# Patient Record
Sex: FEMALE | Race: WHITE | NOT HISPANIC OR LATINO | URBAN - METROPOLITAN AREA
[De-identification: names, ages, dates, MRNs, and addresses within clinical notes are randomized per-mention and may not be internally consistent; named-entity substitution may affect disease eponyms.]

---

## 2018-03-23 ENCOUNTER — OFFICE (OUTPATIENT)
Dept: URBAN - METROPOLITAN AREA CLINIC 11 | Facility: CLINIC | Age: 48
End: 2018-03-23

## 2018-03-23 DIAGNOSIS — R19.7 DIARRHEA, UNSPECIFIED: ICD-10-CM

## 2018-03-28 ENCOUNTER — OFFICE (OUTPATIENT)
Dept: URBAN - METROPOLITAN AREA CLINIC 11 | Facility: CLINIC | Age: 48
End: 2018-03-28

## 2018-03-28 VITALS
HEART RATE: 90 BPM | SYSTOLIC BLOOD PRESSURE: 156 MMHG | WEIGHT: 196 LBS | DIASTOLIC BLOOD PRESSURE: 97 MMHG | HEIGHT: 69 IN

## 2018-03-28 DIAGNOSIS — R10.84 GENERALIZED ABDOMINAL PAIN: ICD-10-CM

## 2018-03-28 DIAGNOSIS — R19.7 DIARRHEA, UNSPECIFIED: ICD-10-CM

## 2018-03-28 DIAGNOSIS — D47.09 OTHER MAST CELL NEOPLASMS OF UNCERTAIN BEHAVIOR: ICD-10-CM

## 2018-03-28 LAB
BASIC METABOLIC PANEL (8): BUN/CREATININE RATIO: 6 — LOW (ref 9–23)
BASIC METABOLIC PANEL (8): BUN: 5 MG/DL — LOW (ref 6–24)
BASIC METABOLIC PANEL (8): CALCIUM: 10 MG/DL (ref 8.7–10.2)
BASIC METABOLIC PANEL (8): CARBON DIOXIDE, TOTAL: 24 MMOL/L (ref 18–29)
BASIC METABOLIC PANEL (8): CHLORIDE: 100 MMOL/L (ref 96–106)
BASIC METABOLIC PANEL (8): CREATININE: 0.85 MG/DL (ref 0.57–1)
BASIC METABOLIC PANEL (8): EGFR IF AFRICN AM: 95 ML/MIN/1.73 (ref 59–?)
BASIC METABOLIC PANEL (8): EGFR IF NONAFRICN AM: 82 ML/MIN/1.73 (ref 59–?)
BASIC METABOLIC PANEL (8): GLUCOSE: 95 MG/DL (ref 65–99)
BASIC METABOLIC PANEL (8): POTASSIUM: 3.9 MMOL/L (ref 3.5–5.2)
BASIC METABOLIC PANEL (8): SODIUM: 143 MMOL/L (ref 134–144)
MAGNESIUM: 2.1 MG/DL (ref 1.6–2.3)

## 2018-03-28 PROCEDURE — 99213 OFFICE O/P EST LOW 20 MIN: CPT | Performed by: INTERNAL MEDICINE

## 2018-03-28 PROCEDURE — 36415 COLL VENOUS BLD VENIPUNCTURE: CPT | Performed by: INTERNAL MEDICINE

## 2018-04-10 ENCOUNTER — OFFICE (OUTPATIENT)
Dept: URBAN - METROPOLITAN AREA CLINIC 19 | Facility: CLINIC | Age: 48
End: 2018-04-10

## 2018-04-27 ENCOUNTER — OFFICE (OUTPATIENT)
Dept: URBAN - METROPOLITAN AREA CLINIC 19 | Facility: CLINIC | Age: 48
End: 2018-04-27

## 2018-05-01 ENCOUNTER — OFFICE (OUTPATIENT)
Dept: URBAN - METROPOLITAN AREA CLINIC 22 | Facility: CLINIC | Age: 48
End: 2018-05-01

## 2018-05-01 DIAGNOSIS — K62.5 HEMORRHAGE OF ANUS AND RECTUM: ICD-10-CM

## 2018-05-01 DIAGNOSIS — R19.7 DIARRHEA, UNSPECIFIED: ICD-10-CM

## 2018-05-01 DIAGNOSIS — R19.5 OTHER FECAL ABNORMALITIES: ICD-10-CM

## 2018-05-01 DIAGNOSIS — R10.84 GENERALIZED ABDOMINAL PAIN: ICD-10-CM

## 2018-05-01 PROCEDURE — 74176 CT ABD & PELVIS W/O CONTRAST: CPT | Performed by: INTERNAL MEDICINE

## 2018-05-11 ENCOUNTER — AMBULATORY SURGICAL CENTER (OUTPATIENT)
Dept: URBAN - METROPOLITAN AREA SURGERY 3 | Facility: SURGERY | Age: 48
End: 2018-05-11

## 2018-05-11 ENCOUNTER — OFFICE (OUTPATIENT)
Dept: URBAN - METROPOLITAN AREA PATHOLOGY 22 | Facility: PATHOLOGY | Age: 48
End: 2018-05-11

## 2018-05-11 VITALS
OXYGEN SATURATION: 96 % | OXYGEN SATURATION: 98 % | TEMPERATURE: 98 F | RESPIRATION RATE: 14 BRPM | DIASTOLIC BLOOD PRESSURE: 84 MMHG | DIASTOLIC BLOOD PRESSURE: 75 MMHG | OXYGEN SATURATION: 100 % | HEART RATE: 76 BPM | OXYGEN SATURATION: 97 % | TEMPERATURE: 98 F | HEART RATE: 74 BPM | HEART RATE: 71 BPM | HEART RATE: 74 BPM | RESPIRATION RATE: 18 BRPM | SYSTOLIC BLOOD PRESSURE: 138 MMHG | SYSTOLIC BLOOD PRESSURE: 136 MMHG | DIASTOLIC BLOOD PRESSURE: 85 MMHG | OXYGEN SATURATION: 98 % | SYSTOLIC BLOOD PRESSURE: 141 MMHG | RESPIRATION RATE: 16 BRPM | SYSTOLIC BLOOD PRESSURE: 129 MMHG | RESPIRATION RATE: 18 BRPM | OXYGEN SATURATION: 97 % | RESPIRATION RATE: 17 BRPM | SYSTOLIC BLOOD PRESSURE: 142 MMHG | RESPIRATION RATE: 16 BRPM | DIASTOLIC BLOOD PRESSURE: 92 MMHG | HEIGHT: 69 IN | RESPIRATION RATE: 22 BRPM | RESPIRATION RATE: 19 BRPM | RESPIRATION RATE: 22 BRPM | SYSTOLIC BLOOD PRESSURE: 144 MMHG | HEART RATE: 80 BPM | RESPIRATION RATE: 19 BRPM | WEIGHT: 196 LBS | SYSTOLIC BLOOD PRESSURE: 121 MMHG | DIASTOLIC BLOOD PRESSURE: 84 MMHG | SYSTOLIC BLOOD PRESSURE: 138 MMHG | DIASTOLIC BLOOD PRESSURE: 97 MMHG | DIASTOLIC BLOOD PRESSURE: 68 MMHG | HEART RATE: 86 BPM | SYSTOLIC BLOOD PRESSURE: 129 MMHG | SYSTOLIC BLOOD PRESSURE: 144 MMHG | OXYGEN SATURATION: 96 % | HEART RATE: 71 BPM | HEART RATE: 80 BPM | SYSTOLIC BLOOD PRESSURE: 121 MMHG | OXYGEN SATURATION: 100 % | WEIGHT: 196 LBS | DIASTOLIC BLOOD PRESSURE: 97 MMHG | HEIGHT: 69 IN | HEART RATE: 76 BPM | SYSTOLIC BLOOD PRESSURE: 141 MMHG | DIASTOLIC BLOOD PRESSURE: 92 MMHG | RESPIRATION RATE: 14 BRPM | SYSTOLIC BLOOD PRESSURE: 136 MMHG | HEART RATE: 75 BPM | SYSTOLIC BLOOD PRESSURE: 142 MMHG | TEMPERATURE: 97.7 F | RESPIRATION RATE: 17 BRPM | DIASTOLIC BLOOD PRESSURE: 68 MMHG | DIASTOLIC BLOOD PRESSURE: 75 MMHG | TEMPERATURE: 97.7 F | HEART RATE: 86 BPM | HEART RATE: 75 BPM | DIASTOLIC BLOOD PRESSURE: 85 MMHG

## 2018-05-11 DIAGNOSIS — R19.5 OTHER FECAL ABNORMALITIES: ICD-10-CM

## 2018-05-11 DIAGNOSIS — K52.9 NONINFECTIVE GASTROENTERITIS AND COLITIS, UNSPECIFIED: ICD-10-CM

## 2018-05-11 DIAGNOSIS — R19.7 DIARRHEA, UNSPECIFIED: ICD-10-CM

## 2018-05-11 PROBLEM — K92.2 GASTROINTESTINAL HEMORRHAGE, UNSPECIFIED: Status: ACTIVE | Noted: 2018-05-11

## 2018-05-11 PROBLEM — K52.89 CLINICALLY SIGNIFICANT DIARRHEA OF UNEXPLAINED ORIGIN: Status: ACTIVE | Noted: 2018-05-11

## 2018-05-11 PROBLEM — K92.2 EVALUATION OF UNEXPLAINED GI BLEEDING: Status: ACTIVE | Noted: 2018-05-11

## 2018-05-11 PROCEDURE — 45380 COLONOSCOPY AND BIOPSY: CPT | Performed by: INTERNAL MEDICINE

## 2018-05-11 PROCEDURE — 88341 IMHCHEM/IMCYTCHM EA ADD ANTB: CPT | Performed by: INTERNAL MEDICINE

## 2018-05-11 PROCEDURE — 88305 TISSUE EXAM BY PATHOLOGIST: CPT | Performed by: INTERNAL MEDICINE

## 2018-05-11 PROCEDURE — 88342 IMHCHEM/IMCYTCHM 1ST ANTB: CPT | Performed by: INTERNAL MEDICINE

## 2019-03-11 ENCOUNTER — OFFICE (OUTPATIENT)
Dept: URBAN - METROPOLITAN AREA CLINIC 19 | Facility: CLINIC | Age: 49
End: 2019-03-11

## 2019-03-12 ENCOUNTER — OFFICE (OUTPATIENT)
Dept: URBAN - METROPOLITAN AREA CLINIC 22 | Facility: CLINIC | Age: 49
End: 2019-03-12

## 2019-03-12 DIAGNOSIS — R19.7 DIARRHEA, UNSPECIFIED: ICD-10-CM

## 2019-03-12 DIAGNOSIS — R19.5 OTHER FECAL ABNORMALITIES: ICD-10-CM

## 2019-03-12 DIAGNOSIS — R10.84 GENERALIZED ABDOMINAL PAIN: ICD-10-CM

## 2019-03-12 DIAGNOSIS — K62.5 HEMORRHAGE OF ANUS AND RECTUM: ICD-10-CM

## 2019-03-12 PROCEDURE — 74177 CT ABD & PELVIS W/CONTRAST: CPT | Performed by: INTERNAL MEDICINE

## 2019-03-21 ENCOUNTER — OFFICE (OUTPATIENT)
Dept: URBAN - METROPOLITAN AREA CLINIC 11 | Facility: CLINIC | Age: 49
End: 2019-03-21

## 2019-03-21 VITALS
HEIGHT: 69 IN | SYSTOLIC BLOOD PRESSURE: 130 MMHG | DIASTOLIC BLOOD PRESSURE: 104 MMHG | HEART RATE: 95 BPM | WEIGHT: 209 LBS

## 2019-03-21 DIAGNOSIS — R19.5 OTHER FECAL ABNORMALITIES: ICD-10-CM

## 2019-03-21 DIAGNOSIS — R19.7 DIARRHEA, UNSPECIFIED: ICD-10-CM

## 2019-03-21 DIAGNOSIS — K52.9 NONINFECTIVE GASTROENTERITIS AND COLITIS, UNSPECIFIED: ICD-10-CM

## 2019-03-21 DIAGNOSIS — D47.09 OTHER MAST CELL NEOPLASMS OF UNCERTAIN BEHAVIOR: ICD-10-CM

## 2019-03-21 DIAGNOSIS — K62.5 HEMORRHAGE OF ANUS AND RECTUM: ICD-10-CM

## 2019-03-21 PROCEDURE — 99213 OFFICE O/P EST LOW 20 MIN: CPT | Performed by: INTERNAL MEDICINE

## 2019-03-21 NOTE — SERVICEHPINOTES
48-year-old female with a history of systemic mastocytosis as well as autonomic dysfunction and gastrointestinal dysmotility.  Patient has had intermittent flare of symptoms with cramping, diarrhea, mucus and blood in the stool over the last 12-14 months.  These have been self-limited but colonoscopy with biopsy reveal left-sided colitis suggesting a protracted course.  Histology favored protracted course for acute self-limited colitis but inflammatory bowel disease could not be totally excluded.Patient reports that 13 days ago she began having another flare with a change in the character for chronic diarrhea with increased blood in mucus in the stool.  She was treated with metronidazole for 1 week but did not notice any change in symptoms.  She has been on Librax Colestid and probiotic with only questionable improvement after restarting Colestid.  She continues to complain of urgency with small volume mucoid bloody stool when she uses the restroom.

## 2019-03-21 NOTE — SERVICENOTES
Will perform flexible sigmoidoscopy to collect stools and biopsy.  This will depend determine further treatment though there is a possibility she may have be on long-term medication such as mesalamine.

## 2019-03-28 ENCOUNTER — OFFICE (OUTPATIENT)
Dept: URBAN - METROPOLITAN AREA PATHOLOGY 22 | Facility: PATHOLOGY | Age: 49
End: 2019-03-28

## 2019-03-28 ENCOUNTER — AMBULATORY SURGICAL CENTER (OUTPATIENT)
Dept: URBAN - METROPOLITAN AREA SURGERY 3 | Facility: SURGERY | Age: 49
End: 2019-03-28

## 2019-03-28 VITALS
WEIGHT: 205 LBS | RESPIRATION RATE: 18 BRPM | HEART RATE: 82 BPM | OXYGEN SATURATION: 96 % | RESPIRATION RATE: 18 BRPM | SYSTOLIC BLOOD PRESSURE: 153 MMHG | DIASTOLIC BLOOD PRESSURE: 98 MMHG | DIASTOLIC BLOOD PRESSURE: 94 MMHG | DIASTOLIC BLOOD PRESSURE: 95 MMHG | DIASTOLIC BLOOD PRESSURE: 98 MMHG | HEART RATE: 85 BPM | HEART RATE: 84 BPM | SYSTOLIC BLOOD PRESSURE: 140 MMHG | SYSTOLIC BLOOD PRESSURE: 148 MMHG | WEIGHT: 205 LBS | DIASTOLIC BLOOD PRESSURE: 81 MMHG | HEIGHT: 69 IN | TEMPERATURE: 98 F | HEART RATE: 98 BPM | HEART RATE: 82 BPM | OXYGEN SATURATION: 98 % | SYSTOLIC BLOOD PRESSURE: 151 MMHG | OXYGEN SATURATION: 98 % | DIASTOLIC BLOOD PRESSURE: 81 MMHG | DIASTOLIC BLOOD PRESSURE: 95 MMHG | HEART RATE: 84 BPM | DIASTOLIC BLOOD PRESSURE: 94 MMHG | RESPIRATION RATE: 17 BRPM | SYSTOLIC BLOOD PRESSURE: 157 MMHG | SYSTOLIC BLOOD PRESSURE: 157 MMHG | TEMPERATURE: 98 F | OXYGEN SATURATION: 97 % | SYSTOLIC BLOOD PRESSURE: 140 MMHG | OXYGEN SATURATION: 97 % | HEIGHT: 69 IN | RESPIRATION RATE: 20 BRPM | SYSTOLIC BLOOD PRESSURE: 153 MMHG | RESPIRATION RATE: 20 BRPM | OXYGEN SATURATION: 96 % | RESPIRATION RATE: 17 BRPM | SYSTOLIC BLOOD PRESSURE: 151 MMHG | HEART RATE: 98 BPM | HEART RATE: 85 BPM | SYSTOLIC BLOOD PRESSURE: 148 MMHG

## 2019-03-28 DIAGNOSIS — K51.90 ULCERATIVE COLITIS, UNSPECIFIED, WITHOUT COMPLICATIONS: ICD-10-CM

## 2019-03-28 DIAGNOSIS — R19.5 OTHER FECAL ABNORMALITIES: ICD-10-CM

## 2019-03-28 DIAGNOSIS — K52.9 NONINFECTIVE GASTROENTERITIS AND COLITIS, UNSPECIFIED: ICD-10-CM

## 2019-03-28 DIAGNOSIS — R19.7 DIARRHEA, UNSPECIFIED: ICD-10-CM

## 2019-03-28 PROCEDURE — 88341 IMHCHEM/IMCYTCHM EA ADD ANTB: CPT | Performed by: INTERNAL MEDICINE

## 2019-03-28 PROCEDURE — 45331 SIGMOIDOSCOPY AND BIOPSY: CPT | Performed by: INTERNAL MEDICINE

## 2019-03-28 PROCEDURE — 88342 IMHCHEM/IMCYTCHM 1ST ANTB: CPT | Performed by: INTERNAL MEDICINE

## 2019-03-28 PROCEDURE — 88305 TISSUE EXAM BY PATHOLOGIST: CPT | Performed by: INTERNAL MEDICINE

## 2019-04-05 ENCOUNTER — OFFICE (OUTPATIENT)
Dept: URBAN - METROPOLITAN AREA CLINIC 11 | Facility: CLINIC | Age: 49
End: 2019-04-05

## 2019-04-05 VITALS
HEIGHT: 69 IN | SYSTOLIC BLOOD PRESSURE: 165 MMHG | DIASTOLIC BLOOD PRESSURE: 103 MMHG | HEART RATE: 82 BPM | WEIGHT: 211 LBS

## 2019-04-05 DIAGNOSIS — R19.7 DIARRHEA, UNSPECIFIED: ICD-10-CM

## 2019-04-05 DIAGNOSIS — D47.09 OTHER MAST CELL NEOPLASMS OF UNCERTAIN BEHAVIOR: ICD-10-CM

## 2019-04-05 DIAGNOSIS — R11.0 NAUSEA: ICD-10-CM

## 2019-04-05 DIAGNOSIS — K51.90 ULCERATIVE COLITIS, UNSPECIFIED, WITHOUT COMPLICATIONS: ICD-10-CM

## 2019-04-05 DIAGNOSIS — K62.5 HEMORRHAGE OF ANUS AND RECTUM: ICD-10-CM

## 2019-04-05 PROCEDURE — 99215 OFFICE O/P EST HI 40 MIN: CPT | Performed by: INTERNAL MEDICINE

## 2019-04-08 ENCOUNTER — OFFICE (OUTPATIENT)
Dept: URBAN - METROPOLITAN AREA CLINIC 19 | Facility: CLINIC | Age: 49
End: 2019-04-08

## 2019-04-08 LAB
IBD EXPANDED PANEL: ACCA: 28 UNITS (ref 0–90)
IBD EXPANDED PANEL: ALCA: 19 UNITS (ref 0–60)
IBD EXPANDED PANEL: AMCA: 32 UNITS (ref 0–100)
IBD EXPANDED PANEL: ATYPICAL PANCA: NEGATIVE
IBD EXPANDED PANEL: COMMENTS: (no result)
IBD EXPANDED PANEL: GASCA: 32 UNITS (ref 0–50)

## 2019-05-03 ENCOUNTER — OFFICE (OUTPATIENT)
Dept: URBAN - METROPOLITAN AREA CLINIC 11 | Facility: CLINIC | Age: 49
End: 2019-05-03

## 2019-05-03 VITALS
WEIGHT: 207 LBS | SYSTOLIC BLOOD PRESSURE: 158 MMHG | HEART RATE: 66 BPM | DIASTOLIC BLOOD PRESSURE: 99 MMHG | HEIGHT: 69 IN

## 2019-05-03 DIAGNOSIS — D47.09 OTHER MAST CELL NEOPLASMS OF UNCERTAIN BEHAVIOR: ICD-10-CM

## 2019-05-03 DIAGNOSIS — K51.90 ULCERATIVE COLITIS, UNSPECIFIED, WITHOUT COMPLICATIONS: ICD-10-CM

## 2019-05-03 DIAGNOSIS — R19.7 DIARRHEA, UNSPECIFIED: ICD-10-CM

## 2019-05-03 LAB
CALPROTECTIN, FECAL: 22 UG/G (ref 0–120)
GI PROFILE, STOOL, PCR: CAMPYLOBACTER: (no result)
LACTOFERRIN, FECAL, QUANT.: 2.35 UG/ML(G) (ref 0–7.24)
REQUEST PROBLEM: (no result)

## 2019-05-03 PROCEDURE — 99213 OFFICE O/P EST LOW 20 MIN: CPT | Performed by: INTERNAL MEDICINE

## 2019-06-25 ENCOUNTER — OFFICE (OUTPATIENT)
Dept: URBAN - METROPOLITAN AREA CLINIC 19 | Facility: CLINIC | Age: 49
End: 2019-06-25

## 2019-07-31 ENCOUNTER — OFFICE (OUTPATIENT)
Dept: URBAN - METROPOLITAN AREA CLINIC 19 | Facility: CLINIC | Age: 49
End: 2019-07-31

## 2019-08-20 ENCOUNTER — OFFICE (OUTPATIENT)
Dept: URBAN - METROPOLITAN AREA CLINIC 19 | Facility: CLINIC | Age: 49
End: 2019-08-20

## 2019-08-29 ENCOUNTER — OFFICE (OUTPATIENT)
Dept: URBAN - METROPOLITAN AREA CLINIC 11 | Facility: CLINIC | Age: 49
End: 2019-08-29

## 2019-08-29 DIAGNOSIS — K51.90 ULCERATIVE COLITIS, UNSPECIFIED, WITHOUT COMPLICATIONS: ICD-10-CM

## 2019-08-29 PROCEDURE — 96413 CHEMO IV INFUSION 1 HR: CPT | Performed by: INTERNAL MEDICINE

## 2019-08-29 PROCEDURE — 96415 CHEMO IV INFUSION ADDL HR: CPT | Performed by: INTERNAL MEDICINE

## 2019-09-13 ENCOUNTER — OFFICE (OUTPATIENT)
Dept: URBAN - METROPOLITAN AREA CLINIC 11 | Facility: CLINIC | Age: 49
End: 2019-09-13

## 2019-09-13 DIAGNOSIS — K51.90 ULCERATIVE COLITIS, UNSPECIFIED, WITHOUT COMPLICATIONS: ICD-10-CM

## 2019-09-13 PROCEDURE — 96413 CHEMO IV INFUSION 1 HR: CPT | Performed by: INTERNAL MEDICINE

## 2019-09-13 PROCEDURE — 96415 CHEMO IV INFUSION ADDL HR: CPT | Performed by: INTERNAL MEDICINE

## 2019-10-15 ENCOUNTER — OFFICE (OUTPATIENT)
Dept: URBAN - METROPOLITAN AREA CLINIC 11 | Facility: CLINIC | Age: 49
End: 2019-10-15

## 2019-10-15 DIAGNOSIS — K51.90 ULCERATIVE COLITIS, UNSPECIFIED, WITHOUT COMPLICATIONS: ICD-10-CM

## 2019-10-15 PROCEDURE — 96415 CHEMO IV INFUSION ADDL HR: CPT | Performed by: INTERNAL MEDICINE

## 2019-10-15 PROCEDURE — 96413 CHEMO IV INFUSION 1 HR: CPT | Performed by: INTERNAL MEDICINE

## 2019-12-10 ENCOUNTER — OFFICE (OUTPATIENT)
Dept: URBAN - METROPOLITAN AREA CLINIC 11 | Facility: CLINIC | Age: 49
End: 2019-12-10

## 2019-12-10 DIAGNOSIS — K51.90 ULCERATIVE COLITIS, UNSPECIFIED, WITHOUT COMPLICATIONS: ICD-10-CM

## 2019-12-10 PROCEDURE — 96415 CHEMO IV INFUSION ADDL HR: CPT | Performed by: INTERNAL MEDICINE

## 2019-12-10 PROCEDURE — 96413 CHEMO IV INFUSION 1 HR: CPT | Performed by: INTERNAL MEDICINE

## 2020-02-04 ENCOUNTER — OFFICE (OUTPATIENT)
Dept: URBAN - METROPOLITAN AREA CLINIC 11 | Facility: CLINIC | Age: 50
End: 2020-02-04

## 2020-02-04 DIAGNOSIS — K51.90 ULCERATIVE COLITIS, UNSPECIFIED, WITHOUT COMPLICATIONS: ICD-10-CM

## 2020-02-04 PROCEDURE — 96413 CHEMO IV INFUSION 1 HR: CPT | Performed by: INTERNAL MEDICINE

## 2020-04-02 ENCOUNTER — OFFICE (OUTPATIENT)
Dept: URBAN - METROPOLITAN AREA CLINIC 11 | Facility: CLINIC | Age: 50
End: 2020-04-02

## 2020-04-02 DIAGNOSIS — K51.90 ULCERATIVE COLITIS, UNSPECIFIED, WITHOUT COMPLICATIONS: ICD-10-CM

## 2020-04-02 PROCEDURE — 96413 CHEMO IV INFUSION 1 HR: CPT | Performed by: INTERNAL MEDICINE

## 2020-05-13 ENCOUNTER — OFFICE (OUTPATIENT)
Dept: URBAN - METROPOLITAN AREA TELEHEALTH 10 | Facility: TELEHEALTH | Age: 50
End: 2020-05-13

## 2020-05-13 VITALS — HEIGHT: 69 IN

## 2020-05-13 DIAGNOSIS — I10 ESSENTIAL (PRIMARY) HYPERTENSION: ICD-10-CM

## 2020-05-13 DIAGNOSIS — D47.09 OTHER MAST CELL NEOPLASMS OF UNCERTAIN BEHAVIOR: ICD-10-CM

## 2020-05-13 DIAGNOSIS — M35.9 SYSTEMIC INVOLVEMENT OF CONNECTIVE TISSUE, UNSPECIFIED: ICD-10-CM

## 2020-05-13 DIAGNOSIS — Z85.72 PERSONAL HISTORY OF NON-HODGKIN LYMPHOMAS: ICD-10-CM

## 2020-05-13 DIAGNOSIS — K51.90 ULCERATIVE COLITIS, UNSPECIFIED, WITHOUT COMPLICATIONS: ICD-10-CM

## 2020-05-13 PROCEDURE — 99215 OFFICE O/P EST HI 40 MIN: CPT | Performed by: INTERNAL MEDICINE

## 2020-05-13 RX ORDER — METHYLPREDNISOLONE 4 MG/1
TABLET ORAL
Qty: 1 | Refills: 0 | Status: COMPLETED
Start: 2020-05-13 | End: 2020-06-01

## 2020-05-13 NOTE — SERVICEHPINOTES
49-year-old female with a history of systemic mastocytosis, ulcerative colitis as well as autonomic dysfunction and gastrointestinal dysmotility.  She is currently receiving Remicade infusions with trough levels in the therapeutic range and no evidence of antibody formation.In terms of her ulcerative colitis, patient reports mild cramping on an intermittent basis.  Her stools are soft to loose and occurred a frequency of 1-2 per day.  There has been no sign of GI bleeding.  She denies any fever or chills though she does have fairly significant nausea at times.Her till health visit today is related to the fact that she has developed significant joint pain that has been attributed to her ulcerative colitis by her primary care provider as well as her hematologist/oncologist physician.  Recent laboratory by her primary care provider failed to reveal any inflammatory markers.  Her HILARIO, CK, thyroid functions, CRP as well as basic CBC and CMP were normal.Today she appears to be in fairly good spirits and looks well.  She complains of joint pain on a daily basis in her elbows and wrists with a intermittent problems with shoulders, ankles and distal metatarsal area.  She also reports significant muscle spasms as well as severe fatigue. She reports she can sleep for 16 hours a day and still feels tired.

## 2020-05-18 ENCOUNTER — OFFICE (OUTPATIENT)
Dept: URBAN - METROPOLITAN AREA CLINIC 19 | Facility: CLINIC | Age: 50
End: 2020-05-18

## 2020-05-28 ENCOUNTER — OFFICE (OUTPATIENT)
Dept: URBAN - METROPOLITAN AREA CLINIC 11 | Facility: CLINIC | Age: 50
End: 2020-05-28

## 2020-05-28 DIAGNOSIS — K51.90 ULCERATIVE COLITIS, UNSPECIFIED, WITHOUT COMPLICATIONS: ICD-10-CM

## 2020-05-28 PROCEDURE — 96413 CHEMO IV INFUSION 1 HR: CPT | Performed by: INTERNAL MEDICINE

## 2020-07-31 ENCOUNTER — OFFICE (OUTPATIENT)
Dept: URBAN - METROPOLITAN AREA CLINIC 11 | Facility: CLINIC | Age: 50
End: 2020-07-31

## 2020-07-31 DIAGNOSIS — K51.90 ULCERATIVE COLITIS, UNSPECIFIED, WITHOUT COMPLICATIONS: ICD-10-CM

## 2020-07-31 PROCEDURE — 96413 CHEMO IV INFUSION 1 HR: CPT | Performed by: INTERNAL MEDICINE

## 2020-10-06 ENCOUNTER — OFFICE (OUTPATIENT)
Dept: URBAN - METROPOLITAN AREA CLINIC 11 | Facility: CLINIC | Age: 50
End: 2020-10-06

## 2020-10-06 DIAGNOSIS — K51.90 ULCERATIVE COLITIS, UNSPECIFIED, WITHOUT COMPLICATIONS: ICD-10-CM

## 2020-10-06 PROCEDURE — 96413 CHEMO IV INFUSION 1 HR: CPT | Performed by: INTERNAL MEDICINE

## 2020-12-07 ENCOUNTER — OFFICE (OUTPATIENT)
Dept: URBAN - METROPOLITAN AREA CLINIC 11 | Facility: CLINIC | Age: 50
End: 2020-12-07

## 2020-12-07 DIAGNOSIS — K51.90 ULCERATIVE COLITIS, UNSPECIFIED, WITHOUT COMPLICATIONS: ICD-10-CM

## 2020-12-07 PROCEDURE — 96413 CHEMO IV INFUSION 1 HR: CPT | Performed by: INTERNAL MEDICINE

## 2021-01-25 VITALS — HEIGHT: 69 IN

## 2021-01-27 ENCOUNTER — TELEHEALTH PROVIDED OTHER THAN IN PATIENT'S HOME (OUTPATIENT)
Dept: URBAN - METROPOLITAN AREA TELEHEALTH 10 | Facility: TELEHEALTH | Age: 51
End: 2021-01-27

## 2021-01-27 DIAGNOSIS — E60 DIETARY ZINC DEFICIENCY: ICD-10-CM

## 2021-01-27 DIAGNOSIS — E54 ASCORBIC ACID DEFICIENCY: ICD-10-CM

## 2021-01-27 DIAGNOSIS — R11.0 NAUSEA: ICD-10-CM

## 2021-01-27 DIAGNOSIS — M35.9 SYSTEMIC INVOLVEMENT OF CONNECTIVE TISSUE, UNSPECIFIED: ICD-10-CM

## 2021-01-27 DIAGNOSIS — E50.9 VITAMIN A DEFICIENCY, UNSPECIFIED: ICD-10-CM

## 2021-01-27 DIAGNOSIS — K51.90 ULCERATIVE COLITIS, UNSPECIFIED, WITHOUT COMPLICATIONS: ICD-10-CM

## 2021-01-27 DIAGNOSIS — R19.7 DIARRHEA, UNSPECIFIED: ICD-10-CM

## 2021-01-27 RX ORDER — OMEPRAZOLE 40 MG/1
40 CAPSULE, DELAYED RELEASE ORAL
Qty: 30 | Refills: 3 | Status: COMPLETED
Start: 2021-01-27 | End: 2021-03-29

## 2021-01-27 NOTE — SERVICENOTES
We discussed the fact that if her ulcerative colitis is flaring and infliximab levels are therapeutic than a change to a different agent may be necessary, The duration of the telehealth visit was 24 minutes and 28 seconds.

## 2021-01-27 NOTE — SERVICEHPINOTES
50-year-old female with multiple medical problem including ulcerative colitis, systemic mastocytosis, autonomic dysfunction ( Baroreflex dysfunction )   and history of lymphoma among others.Patient agree to telehealth visit today to discuss her symptoms.  She has daily nausea but this has worsened over the last month and seems to have occurred about the time she started Singulair and levothyroxine.  Nausea is only resulted in vomiting once and seems to be diminished with food intake, particularly mashed potatoes, oat meal or crackers.  Patient also reports of intermittent abdominal pain but is describing a cramping burning discomfort in the lower abdomen as well as watery diarrhea at a frequency of 1-6 times per day.  This is actually been worse over the last 3 days with stool frequency up to 10 times per day.  She denies any fever or chills.  There has been no sign of bleeding.Patient was ill most of the fall because of COVID-19 infection and really has not regained much with strength after that illness.

## 2021-02-09 ENCOUNTER — OFFICE (OUTPATIENT)
Dept: URBAN - METROPOLITAN AREA CLINIC 19 | Facility: CLINIC | Age: 51
End: 2021-02-09

## 2021-02-09 LAB
INFLIXIMAB+AB (SERIAL MONITOR): ANTI-INFLIXIMAB ANTIBODY: <22 NG/ML
INFLIXIMAB+AB (SERIAL MONITOR): INFLIXIMAB DRUG LEVEL: 12 UG/ML

## 2021-02-22 LAB
CALPROTECTIN, FECAL: 23 UG/G (ref 0–120)
GI PROFILE, STOOL, PCR: ADENOVIRUS F 40/41: NOT DETECTED
GI PROFILE, STOOL, PCR: ASTROVIRUS: NOT DETECTED
GI PROFILE, STOOL, PCR: C DIFFICILE TOXIN A/B: NOT DETECTED
GI PROFILE, STOOL, PCR: CAMPYLOBACTER: NOT DETECTED
GI PROFILE, STOOL, PCR: CRYPTOSPORIDIUM: NOT DETECTED
GI PROFILE, STOOL, PCR: CYCLOSPORA CAYETANENSIS: NOT DETECTED
GI PROFILE, STOOL, PCR: E COLI O157: (no result)
GI PROFILE, STOOL, PCR: ENTAMOEBA HISTOLYTICA: NOT DETECTED
GI PROFILE, STOOL, PCR: ENTEROAGGREGATIVE E COLI: NOT DETECTED
GI PROFILE, STOOL, PCR: ENTEROPATHOGENIC E COLI: NOT DETECTED
GI PROFILE, STOOL, PCR: ENTEROTOXIGENIC E COLI: NOT DETECTED
GI PROFILE, STOOL, PCR: GIARDIA LAMBLIA: NOT DETECTED
GI PROFILE, STOOL, PCR: NOROVIRUS GI/GII: NOT DETECTED
GI PROFILE, STOOL, PCR: PLESIOMONAS SHIGELLOIDES: NOT DETECTED
GI PROFILE, STOOL, PCR: ROTAVIRUS A: NOT DETECTED
GI PROFILE, STOOL, PCR: SALMONELLA: NOT DETECTED
GI PROFILE, STOOL, PCR: SAPOVIRUS: NOT DETECTED
GI PROFILE, STOOL, PCR: SHIGA-TOXIN-PRODUCING E COLI: NOT DETECTED
GI PROFILE, STOOL, PCR: SHIGELLA/ENTEROINVASIVE E COLI: NOT DETECTED
GI PROFILE, STOOL, PCR: VIBRIO CHOLERAE: NOT DETECTED
GI PROFILE, STOOL, PCR: VIBRIO: NOT DETECTED
GI PROFILE, STOOL, PCR: YERSINIA ENTEROCOLITICA: NOT DETECTED
H. PYLORI STOOL AG, EIA: NEGATIVE

## 2021-03-03 ENCOUNTER — OFFICE (OUTPATIENT)
Dept: URBAN - METROPOLITAN AREA CLINIC 11 | Facility: CLINIC | Age: 51
End: 2021-03-03

## 2021-03-03 DIAGNOSIS — K51.90 ULCERATIVE COLITIS, UNSPECIFIED, WITHOUT COMPLICATIONS: ICD-10-CM

## 2021-03-03 PROCEDURE — 96413 CHEMO IV INFUSION 1 HR: CPT | Performed by: INTERNAL MEDICINE

## 2021-03-05 VITALS — HEIGHT: 69 IN

## 2021-03-11 ENCOUNTER — TELEHEALTH PROVIDED OTHER THAN IN PATIENT'S HOME (OUTPATIENT)
Dept: URBAN - METROPOLITAN AREA TELEHEALTH 10 | Facility: TELEHEALTH | Age: 51
End: 2021-03-11

## 2021-03-11 DIAGNOSIS — K51.90 ULCERATIVE COLITIS, UNSPECIFIED, WITHOUT COMPLICATIONS: ICD-10-CM

## 2021-03-11 DIAGNOSIS — Z85.72 PERSONAL HISTORY OF NON-HODGKIN LYMPHOMAS: ICD-10-CM

## 2021-03-11 DIAGNOSIS — D47.0: ICD-10-CM

## 2021-03-11 NOTE — SERVICENOTES
The difficulty in her case is that her systemic mastocytosis may have significant abdominal pain and diarrhea.  Unfortunately despite endoscopic and histologic demonstration of severe inflammation, she has not had an elevated lactoferrin or calprotectin that we can use as an adjunct marker of her disease severity.


The patient is aware this visit will be billed., The duration of the telehealth visit was over 24 minutes. pMD did not record how long the call was, but there were two phone calls (initial 7-8 minutes) and second 18 minutes

## 2021-03-11 NOTE — SERVICEHPINOTES
Mrs. Stkoes   Is an extremely pleasant 50-year-old  female who I am seeing for the 1st time.  She was previously seen by Dr. Gibson Frederick in our group.  She was diagnosed with systemic mastocytosis in 2015.  her for symptoms of BC began in January 2018 with rectal pain, bleeding, diarrhea. She had stool studies in March 2018 which were negative for infection and negative for white blood cells. She had negative celiac serologies on April 10, 2018. She had a CT abdomen without contrast on May 1, 2018 which did not show any GI abnormality. She underwent colonoscopy on May 11, 2018 which showed normal mucosal in the distal ileum. There was diffuse granularity, congestion, and friability from the rectum to the descending colon. Biopsies from the right colon and ileum were normal with mildly active colitis in the rectum and sigmoid. She was started on Canasa suppositories. She did not have much improvement underwent a flexible sigmoidoscopy on March 28, 2019 which showed diffuse erosions, granularity and friability in the rectum and sigmoid with normal descending colon. Biopsies from the rectum and sigmoid demonstrated moderately active chronic colitis and biopsies from the normal appearing descending colon demonstrated mildly active colitis. She subsequently had an IBD extended panel on April 8, 2019 which was negative. She was then placed on oral budesonide mesalamine with mesalamine suppositories, but there was no improvement her diarrhea, tenesmus and bleeding. She subsequently had a normal lactoferrin and calprotectin both on May 2019. She was started on Remicade on August 29, 2019. Despite Remicade she continued to have severe diarrhea, urgency and pain. She also describes worsening joint pains and crippling fatigues. In February of 2021 her inflixmab levels were checked and she had a great drug level at 12 with no antibodies. She was then deemed to be a TNF nonresponder and so she was switched to Entyvio. Since being off Remicade her joint pains, fatigue have greatly improved. Today she tells me that she had her 1st Entyvio infusion last week. She remains on mesalamine 2.4 g in the morning and 2.4 g in the evening as well as Uceris 9 mg a day. She is currently having on average 6 7 bowel movements a day. At the worse she will have 10 a day, more recent she is having 3 a day. She previously had hematochezia with each bowel movement, but she has not had any bleeding in the last 2 days. She has some occasional nocturnal diarrhea. She does not use any NSAIDs.

## 2021-03-17 ENCOUNTER — OFFICE (OUTPATIENT)
Dept: URBAN - METROPOLITAN AREA CLINIC 11 | Facility: CLINIC | Age: 51
End: 2021-03-17

## 2021-03-17 DIAGNOSIS — K51.90 ULCERATIVE COLITIS, UNSPECIFIED, WITHOUT COMPLICATIONS: ICD-10-CM

## 2021-03-17 PROCEDURE — 96413 CHEMO IV INFUSION 1 HR: CPT | Performed by: INTERNAL MEDICINE

## 2021-03-22 VITALS — HEIGHT: 69 IN

## 2021-04-08 ENCOUNTER — TELEHEALTH PROVIDED OTHER THAN IN PATIENT'S HOME (OUTPATIENT)
Dept: URBAN - METROPOLITAN AREA TELEHEALTH 10 | Facility: TELEHEALTH | Age: 51
End: 2021-04-08

## 2021-04-08 DIAGNOSIS — D47.0: ICD-10-CM

## 2021-04-08 DIAGNOSIS — K51.90 ULCERATIVE COLITIS, UNSPECIFIED, WITHOUT COMPLICATIONS: ICD-10-CM

## 2021-04-08 DIAGNOSIS — M35.9 SYSTEMIC INVOLVEMENT OF CONNECTIVE TISSUE, UNSPECIFIED: ICD-10-CM

## 2021-04-08 RX ORDER — COLESTIPOL HYDROCHLORIDE 1 G/1
TABLET, FILM COATED ORAL
Qty: 60 | Refills: 3 | Status: COMPLETED
Start: 2021-04-08 | End: 2021-04-26

## 2021-04-08 RX ORDER — DICYCLOMINE HYDROCHLORIDE 10 MG/1
CAPSULE ORAL
Qty: 60 | Refills: 5 | Status: COMPLETED
Start: 2021-04-08 | End: 2021-12-15

## 2021-04-08 NOTE — SERVICENOTES
The duration of the telehealth visit was 13 minutes and 41 seconds. The patient is aware this visit will be billed.

## 2021-04-08 NOTE — SERVICEHPINOTES
Mrs. Stokes Is an extremely pleasant 50-year-old  female who I am seeing for the 1st time. She was previously seen by Dr. Gibson Frederick in our group. She was diagnosed with systemic mastocytosis in 2015. her for symptoms of BC began in January 2018 with rectal pain, bleeding, diarrhea. She had stool studies in March 2018 which were negative for infection and negative for white blood cells. She had negative celiac serologies on April 10, 2018. She had a CT abdomen without contrast on May 1, 2018 which did not show any GI abnormality. She underwent colonoscopy on May 11, 2018 which showed normal mucosal in the distal ileum. There was diffuse granularity, congestion, and friability from the rectum to the descending colon. Biopsies from the right colon and ileum were normal with mildly active colitis in the rectum and sigmoid. She was started on Canasa suppositories. She did not have much improvement underwent a flexible sigmoidoscopy on March 28, 2019 which showed diffuse erosions, granularity and friability in the rectum and sigmoid with normal descending colon. Biopsies from the rectum and sigmoid demonstrated moderately active chronic colitis and biopsies from the normal appearing descending colon demonstrated mildly active colitis. She subsequently had an IBD extended panel on April 8, 2019 which was negative. She was then placed on oral budesonide mesalamine with mesalamine suppositories, but there was no improvement her diarrhea, tenesmus and bleeding. She subsequently had a normal lactoferrin and calprotectin both on May 2019. She was started on Remicade on August 29, 2019. Despite Remicade she continued to have severe diarrhea, urgency and pain. She also describes worsening joint pains and crippling fatigues. In February of 2021 her inflixmab levels were checked and she had a great drug level at 12 with no antibodies. She was then deemed to be a TNF nonresponder and so she was switched to Entyvio. Since being off Remicade her joint pains, fatigue have greatly improved. On 03/11/2021 she told me that she had her 1st Entyvio infusion last week. She remained on mesalamine 2.4 g in the morning and 2.4 g in the evening as well as Uceris 9 mg a day. She was currently having on average 6-7 bowel movements a day. At the worse she would have 10 a day, more recently she was having 3 a day. She previously had hematochezia with each bowel movement, but she has not had any bleeding in the last few days. She has had some occasional nocturnal diarrhea. She does not use any NSAIDs.On follow-up today, she unfortunately is not any better. She has now had 2 Entyvio infusions. She continues to have 6-7 bowel movements a day with urgency. She is having increasing sharp lower quadrant crampy pains. She has been taking Imodium over-the-counter just so that she can leave the house. She sees Dr. Chapman and was diagnosed with low Vitamin A, C, an Zinc. She tells me she did well with colestipol in the past and is interested in dicyclomine.

## 2021-04-14 ENCOUNTER — OFFICE (OUTPATIENT)
Dept: URBAN - METROPOLITAN AREA CLINIC 11 | Facility: CLINIC | Age: 51
End: 2021-04-14

## 2021-04-14 DIAGNOSIS — K51.90 ULCERATIVE COLITIS, UNSPECIFIED, WITHOUT COMPLICATIONS: ICD-10-CM

## 2021-04-14 PROCEDURE — 96413 CHEMO IV INFUSION 1 HR: CPT | Performed by: INTERNAL MEDICINE

## 2021-06-09 ENCOUNTER — OFFICE (OUTPATIENT)
Dept: URBAN - METROPOLITAN AREA CLINIC 11 | Facility: CLINIC | Age: 51
End: 2021-06-09

## 2021-06-09 DIAGNOSIS — K51.90 ULCERATIVE COLITIS, UNSPECIFIED, WITHOUT COMPLICATIONS: ICD-10-CM

## 2021-06-09 PROCEDURE — 96413 CHEMO IV INFUSION 1 HR: CPT | Performed by: INTERNAL MEDICINE

## 2021-06-15 VITALS — HEIGHT: 69 IN

## 2021-06-18 ENCOUNTER — TELEHEALTH PROVIDED OTHER THAN IN PATIENT'S HOME (OUTPATIENT)
Dept: URBAN - METROPOLITAN AREA TELEHEALTH 10 | Facility: TELEHEALTH | Age: 51
End: 2021-06-18

## 2021-06-18 DIAGNOSIS — Z85.72 PERSONAL HISTORY OF NON-HODGKIN LYMPHOMAS: ICD-10-CM

## 2021-06-18 DIAGNOSIS — M35.9 SYSTEMIC INVOLVEMENT OF CONNECTIVE TISSUE, UNSPECIFIED: ICD-10-CM

## 2021-06-18 DIAGNOSIS — K51.90 ULCERATIVE COLITIS, UNSPECIFIED, WITHOUT COMPLICATIONS: ICD-10-CM

## 2021-06-18 NOTE — SERVICENOTES
The patient is aware this visit will be billed. The duration of the telehealth visit was 7 minutes and 26 seconds.

## 2021-06-18 NOTE — SERVICEHPINOTES
Mrs. Stokes Is an extremely pleasant 50-year-old  female who I am seeing for the 1st time. She was previously seen by Dr. Gibson Frederick in our group. She was diagnosed with systemic mastocytosis in 2015. her for symptoms of BC began in January 2018 with rectal pain, bleeding, diarrhea. She had stool studies in March 2018 which were negative for infection and negative for white blood cells. She had negative celiac serologies on April 10, 2018. She had a CT abdomen without contrast on May 1, 2018 which did not show any GI abnormality. She underwent colonoscopy on May 11, 2018 which showed normal mucosal in the distal ileum. There was diffuse granularity, congestion, and friability from the rectum to the descending colon. Biopsies from the right colon and ileum were normal with mildly active colitis in the rectum and sigmoid. She was started on Canasa suppositories. She did not have much improvement underwent a flexible sigmoidoscopy on March 28, 2019 which showed diffuse erosions, granularity and friability in the rectum and sigmoid with normal descending colon. Biopsies from the rectum and sigmoid demonstrated moderately active chronic colitis and biopsies from the normal appearing descending colon demonstrated mildly active colitis. She subsequently had an IBD extended panel on April 8, 2019 which was negative. She was then placed on oral budesonide mesalamine with mesalamine suppositories, but there was no improvement her diarrhea, tenesmus and bleeding. She subsequently had a normal lactoferrin and calprotectin both on May 2019. She was started on Remicade on August 29, 2019. Despite Remicade she continued to have severe diarrhea, urgency and pain. She also describes worsening joint pains and crippling fatigues. In February of 2021 her inflixmab levels were checked and she had a great drug level at 12 with no antibodies. She was then deemed to be a TNF nonresponder and so she was switched to Entyvio. Since being off Remicade her joint pains, fatigue have greatly improved. On 03/11/2021 she told me that she had her 1st Entyvio infusion last week. She remained on mesalamine 2.4 g in the morning and 2.4 g in the evening as well as Uceris 9 mg a day. She was currently having on average 6-7 bowel movements a day. At the worse she would have 10 a day, more recently she was having 3 a day. She previously had hematochezia with each bowel movement, but she has not had any bleeding in the last few days. She has had some occasional nocturnal diarrhea. She does not use any NSAIDs.On follow-up 4/8/21, she unfortunately was not any better. She had now had 2 Entyvio infusions. She continued to have 6-7 bowel movements a day with urgency. She was having increasing sharp lower quadrant crampy pains. She had been taking Imodium over-the-counter just so that she can leave the house. She sees Dr. Chapman and was diagnosed with low Vitamin A, C, an Zinc. She told me she did well with colestipol in the past and is interested in dicyclomine.On follow-up today, she is doing great.  This is the best she has felt in 2 years.  She is having on average 2 bowel movements a day.  No nocturnal diarrhea no hematochezia or mucus.  She describes her stools as soft, but they are beginning to form up. Her only issue is the feeling of urgency.

## 2021-08-04 ENCOUNTER — OFFICE (OUTPATIENT)
Dept: URBAN - METROPOLITAN AREA CLINIC 11 | Facility: CLINIC | Age: 51
End: 2021-08-04

## 2021-08-04 DIAGNOSIS — K51.90 ULCERATIVE COLITIS, UNSPECIFIED, WITHOUT COMPLICATIONS: ICD-10-CM

## 2021-08-04 LAB
VEDOLIZUMAB DRUG + ANTIBODY: ANTI-VEDOLIZUMAB ANTIBODY: <25 NG/ML
VEDOLIZUMAB DRUG + ANTIBODY: VEDOLIZUMAB: 17 UG/ML

## 2021-08-04 PROCEDURE — 96413 CHEMO IV INFUSION 1 HR: CPT | Performed by: INTERNAL MEDICINE

## 2021-09-24 ENCOUNTER — OFFICE (OUTPATIENT)
Dept: URBAN - METROPOLITAN AREA CLINIC 11 | Facility: CLINIC | Age: 51
End: 2021-09-24

## 2021-09-24 DIAGNOSIS — K51.90 ULCERATIVE COLITIS, UNSPECIFIED, WITHOUT COMPLICATIONS: ICD-10-CM

## 2021-09-24 PROCEDURE — 96413 CHEMO IV INFUSION 1 HR: CPT | Performed by: INTERNAL MEDICINE

## 2021-10-15 ENCOUNTER — OFFICE (OUTPATIENT)
Dept: URBAN - METROPOLITAN AREA CLINIC 11 | Facility: CLINIC | Age: 51
End: 2021-10-15

## 2021-11-04 ENCOUNTER — TELEHEALTH PROVIDED OTHER THAN IN PATIENT'S HOME (OUTPATIENT)
Dept: URBAN - METROPOLITAN AREA TELEHEALTH 10 | Facility: TELEHEALTH | Age: 51
End: 2021-11-04

## 2021-11-04 VITALS — HEIGHT: 69 IN

## 2021-11-04 DIAGNOSIS — Z85.72 PERSONAL HISTORY OF NON-HODGKIN LYMPHOMAS: ICD-10-CM

## 2021-11-04 DIAGNOSIS — D47.09 OTHER MAST CELL NEOPLASMS OF UNCERTAIN BEHAVIOR: ICD-10-CM

## 2021-11-04 DIAGNOSIS — M35.9 SYSTEMIC INVOLVEMENT OF CONNECTIVE TISSUE, UNSPECIFIED: ICD-10-CM

## 2021-11-04 DIAGNOSIS — K51.90 ULCERATIVE COLITIS, UNSPECIFIED, WITHOUT COMPLICATIONS: ICD-10-CM

## 2021-11-04 RX ORDER — MESALAMINE 1.2 G/1
4.8 TABLET, DELAYED RELEASE ORAL
Qty: 120 | Refills: 11 | Status: COMPLETED
End: 2023-09-27

## 2021-11-04 RX ORDER — DICYCLOMINE HYDROCHLORIDE 10 MG/1
CAPSULE ORAL
Qty: 60 | Refills: 5 | Status: COMPLETED
Start: 2021-04-08 | End: 2021-12-15

## 2021-11-19 ENCOUNTER — OFFICE (OUTPATIENT)
Dept: URBAN - METROPOLITAN AREA CLINIC 11 | Facility: CLINIC | Age: 51
End: 2021-11-19

## 2021-11-19 DIAGNOSIS — K51.90 ULCERATIVE COLITIS, UNSPECIFIED, WITHOUT COMPLICATIONS: ICD-10-CM

## 2021-11-19 PROCEDURE — 96413 CHEMO IV INFUSION 1 HR: CPT | Performed by: INTERNAL MEDICINE

## 2022-01-12 ENCOUNTER — OFFICE (OUTPATIENT)
Dept: URBAN - METROPOLITAN AREA CLINIC 11 | Facility: CLINIC | Age: 52
End: 2022-01-12

## 2022-01-12 DIAGNOSIS — K51.90 ULCERATIVE COLITIS, UNSPECIFIED, WITHOUT COMPLICATIONS: ICD-10-CM

## 2022-01-12 PROCEDURE — 96413 CHEMO IV INFUSION 1 HR: CPT | Performed by: INTERNAL MEDICINE

## 2022-03-11 ENCOUNTER — OFFICE (OUTPATIENT)
Dept: URBAN - METROPOLITAN AREA CLINIC 11 | Facility: CLINIC | Age: 52
End: 2022-03-11

## 2022-03-11 VITALS
HEART RATE: 66 BPM | DIASTOLIC BLOOD PRESSURE: 84 MMHG | SYSTOLIC BLOOD PRESSURE: 131 MMHG | DIASTOLIC BLOOD PRESSURE: 92 MMHG | HEART RATE: 68 BPM | TEMPERATURE: 98.4 F | RESPIRATION RATE: 18 BRPM | HEIGHT: 69 IN | HEART RATE: 70 BPM | DIASTOLIC BLOOD PRESSURE: 81 MMHG | SYSTOLIC BLOOD PRESSURE: 136 MMHG | SYSTOLIC BLOOD PRESSURE: 122 MMHG | TEMPERATURE: 98.2 F

## 2022-03-11 DIAGNOSIS — K51.90 ULCERATIVE COLITIS, UNSPECIFIED, WITHOUT COMPLICATIONS: ICD-10-CM

## 2022-03-11 LAB
CBC, PLATELET, NO DIFFERENTIAL: HEMATOCRIT: 40.7 % (ref 34–46.6)
CBC, PLATELET, NO DIFFERENTIAL: HEMOGLOBIN: 13.3 G/DL (ref 11.1–15.9)
CBC, PLATELET, NO DIFFERENTIAL: MCH: 29.8 PG (ref 26.6–33)
CBC, PLATELET, NO DIFFERENTIAL: MCHC: 32.7 G/DL (ref 31.5–35.7)
CBC, PLATELET, NO DIFFERENTIAL: MCV: 91 FL (ref 79–97)
CBC, PLATELET, NO DIFFERENTIAL: PLATELETS: 307 X10E3/UL (ref 150–450)
CBC, PLATELET, NO DIFFERENTIAL: RBC: 4.47 X10E6/UL (ref 3.77–5.28)
CBC, PLATELET, NO DIFFERENTIAL: RDW: 13 % (ref 11.7–15.4)
CBC, PLATELET, NO DIFFERENTIAL: WBC: 5.7 X10E3/UL (ref 3.4–10.8)
COMP. METABOLIC PANEL (14): A/G RATIO: 1.6 (ref 1.2–2.2)
COMP. METABOLIC PANEL (14): ALBUMIN: 4.5 G/DL (ref 3.8–4.8)
COMP. METABOLIC PANEL (14): ALKALINE PHOSPHATASE: 83 IU/L (ref 44–121)
COMP. METABOLIC PANEL (14): ALT (SGPT): 19 IU/L (ref 0–32)
COMP. METABOLIC PANEL (14): AST (SGOT): 20 IU/L (ref 0–40)
COMP. METABOLIC PANEL (14): BILIRUBIN, TOTAL: <0.2 MG/DL
COMP. METABOLIC PANEL (14): BUN/CREATININE RATIO: 9 (ref 9–23)
COMP. METABOLIC PANEL (14): BUN: 7 MG/DL (ref 6–24)
COMP. METABOLIC PANEL (14): CALCIUM: 9.3 MG/DL (ref 8.7–10.2)
COMP. METABOLIC PANEL (14): CARBON DIOXIDE, TOTAL: 23 MMOL/L (ref 20–29)
COMP. METABOLIC PANEL (14): CHLORIDE: 102 MMOL/L (ref 96–106)
COMP. METABOLIC PANEL (14): CREATININE: 0.81 MG/DL (ref 0.57–1)
COMP. METABOLIC PANEL (14): EGFR: 88 ML/MIN/1.73 (ref 59–?)
COMP. METABOLIC PANEL (14): GLOBULIN, TOTAL: 2.9 G/DL (ref 1.5–4.5)
COMP. METABOLIC PANEL (14): GLUCOSE: 119 MG/DL — HIGH (ref 65–99)
COMP. METABOLIC PANEL (14): POTASSIUM: 3.8 MMOL/L (ref 3.5–5.2)
COMP. METABOLIC PANEL (14): PROTEIN, TOTAL: 7.4 G/DL (ref 6–8.5)
COMP. METABOLIC PANEL (14): SODIUM: 144 MMOL/L (ref 134–144)

## 2022-03-11 PROCEDURE — 96413 CHEMO IV INFUSION 1 HR: CPT | Performed by: INTERNAL MEDICINE

## 2022-04-14 VITALS — HEIGHT: 69 IN

## 2022-04-22 ENCOUNTER — TELEHEALTH PROVIDED OTHER THAN IN PATIENT'S HOME (OUTPATIENT)
Dept: URBAN - METROPOLITAN AREA TELEHEALTH 10 | Facility: TELEHEALTH | Age: 52
End: 2022-04-22

## 2022-04-22 DIAGNOSIS — Z85.72 PERSONAL HISTORY OF NON-HODGKIN LYMPHOMAS: ICD-10-CM

## 2022-04-22 DIAGNOSIS — K51.90 ULCERATIVE COLITIS, UNSPECIFIED, WITHOUT COMPLICATIONS: ICD-10-CM

## 2022-04-22 NOTE — SERVICEHPINOTES
Mrs. Stokes Is an extremely pleasant 51-year-old  female who previously was seen by Dr. Gibson Frederick in our group. She was diagnosed with systemic mastocytosis in 2015. her for symptoms of BC began in January 2018 with rectal pain, bleeding, diarrhea. She had stool studies in March 2018 which were negative for infection and negative for white blood cells. She had negative celiac serologies on April 10, 2018. She had a CT abdomen without contrast on May 1, 2018 which did not show any GI abnormality. She underwent colonoscopy on May 11, 2018 which showed normal mucosal in the distal ileum. There was diffuse granularity, congestion, and friability from the rectum to the descending colon. Biopsies from the right colon and ileum were normal with mildly active colitis in the rectum and sigmoid. She was started on Canasa suppositories. She did not have much improvement underwent a flexible sigmoidoscopy on March 28, 2019 which showed diffuse erosions, granularity and friability in the rectum and sigmoid with normal descending colon. Biopsies from the rectum and sigmoid demonstrated moderately active chronic colitis and biopsies from the normal appearing descending colon demonstrated mildly active colitis. She subsequently had an IBD extended panel on April 8, 2019 which was negative. She was then placed on oral budesonide mesalamine with mesalamine suppositories, but there was no improvement her diarrhea, tenesmus and bleeding. She subsequently had a normal lactoferrin and calprotectin both on May 2019. She was started on Remicade on August 29, 2019. Despite Remicade she continued to have severe diarrhea, urgency and pain. She also describes worsening joint pains and crippling fatigues. In February of 2021 her inflixmab levels were checked and she had a great drug level at 12 with no antibodies. She was then deemed to be a TNF nonresponder and so she was switched to Entyvio. Since being off Remicade her joint pains, fatigue have greatly improved.On 03/11/2021 she told me that she had her 1st Entyvio infusion last week. She remained on mesalamine 2.4 g in the morning and 2.4 g in the evening as well as Uceris 9 mg a day. She was currently having on average 6-7 bowel movements a day. At the worse she would have 10 a day, more recently she was having 3 a day. She previously had hematochezia with each bowel movement, but she has not had any bleeding in the last few days. She has had some occasional nocturnal diarrhea. She does not use any NSAIDs.On follow-up 4/8/21, she unfortunately was not any better. She had now had 2 Entyvio infusions. She continued to have 6-7 bowel movements a day with urgency. She was having increasing sharp lower quadrant crampy pains. She had been taking Imodium over-the-counter just so that she can leave the house. She sees Dr. Chapman and was diagnosed with low Vitamin A, C, an Zinc. She told me she did well with colestipol in the past and is interested in dicyclomine.On follow-up 6/18/21 she was doing great. This was the best she has felt in 2 years. She was having on average 2 bowel movements a day. No nocturnal diarrhea no hematochezia or mucus. She describes her stools as soft, but they were beginning to form up. Her only issue was the feeling of urgency.On follow up 11/4/21 she had a flare that began approximately 5-6 weeks prior. We checked a calprotectin which was normal at that time as well as a molecular stool profile. We started her on prednisone 40 mg a day. Within 2 days of 40 mg of prednisone her bleeding stopped. Her diarrhea frequency got much better as well. At time of visit she was back to having 1 bowel movement a day. She still had some urgency, cramping and burning in the left lower quadrant. She was taking dicyclomine frequently which helped. She had been under a tremendous amount of stress.
br
lucas      On follow-up today, she is overall doing quite well.  She denies any significant diarrhea, constipation, mucus, abdominal pain.  She has not had any prednisone in 4 months or so.  She feels as well as she has ever felt.  That being said she has developed a enlarged lymph node underneath her left jaw.  It is been ultrasounded and deemed too large.  She has had scans and is going to have a PET scan Monday.?   She and her  have been eating very low carb and she overall feels better.

## 2022-04-22 NOTE — SERVICENOTES
The patient is aware this visit will be billed. The duration of the telehealth visit was 6 minutes and 43 seconds.

## 2022-04-28 ENCOUNTER — OFFICE (OUTPATIENT)
Dept: URBAN - METROPOLITAN AREA CLINIC 11 | Facility: CLINIC | Age: 52
End: 2022-04-28

## 2022-04-28 VITALS
HEART RATE: 63 BPM | TEMPERATURE: 97.8 F | DIASTOLIC BLOOD PRESSURE: 80 MMHG | RESPIRATION RATE: 18 BRPM | HEIGHT: 69 IN | SYSTOLIC BLOOD PRESSURE: 109 MMHG | HEART RATE: 64 BPM | DIASTOLIC BLOOD PRESSURE: 74 MMHG | TEMPERATURE: 97.1 F | SYSTOLIC BLOOD PRESSURE: 116 MMHG | HEART RATE: 62 BPM | DIASTOLIC BLOOD PRESSURE: 85 MMHG

## 2022-04-28 DIAGNOSIS — K51.90 ULCERATIVE COLITIS, UNSPECIFIED, WITHOUT COMPLICATIONS: ICD-10-CM

## 2022-04-28 PROCEDURE — 96413 CHEMO IV INFUSION 1 HR: CPT | Performed by: INTERNAL MEDICINE

## 2022-06-29 ENCOUNTER — OFFICE (OUTPATIENT)
Dept: URBAN - METROPOLITAN AREA CLINIC 11 | Facility: CLINIC | Age: 52
End: 2022-06-29

## 2022-06-29 VITALS
DIASTOLIC BLOOD PRESSURE: 85 MMHG | HEART RATE: 61 BPM | DIASTOLIC BLOOD PRESSURE: 74 MMHG | HEART RATE: 78 BPM | RESPIRATION RATE: 18 BRPM | TEMPERATURE: 98.3 F | HEART RATE: 85 BPM | DIASTOLIC BLOOD PRESSURE: 92 MMHG | SYSTOLIC BLOOD PRESSURE: 140 MMHG | HEIGHT: 69 IN | SYSTOLIC BLOOD PRESSURE: 137 MMHG | SYSTOLIC BLOOD PRESSURE: 146 MMHG

## 2022-06-29 DIAGNOSIS — K51.90 ULCERATIVE COLITIS, UNSPECIFIED, WITHOUT COMPLICATIONS: ICD-10-CM

## 2022-06-29 PROCEDURE — 96413 CHEMO IV INFUSION 1 HR: CPT | Performed by: INTERNAL MEDICINE

## 2022-06-29 NOTE — SERVICEHPINOTES
See follow up visit from  4/22/2022   .  brDate / Results of last TB test  9/24/2021   -   Negative  brLabs and/or Additional orders: CBC and CMP due in SeptemberbrInsurance authorization:[employer] Approved PA for continuation of Entyvio-06/09/22-12/05/22 (SP)brPharmacy:  Specialty  brRate of Infusion:  Standard   
br   Infusion order placed on:  1/6/2022   
br

## 2022-08-24 ENCOUNTER — OFFICE (OUTPATIENT)
Dept: URBAN - METROPOLITAN AREA CLINIC 11 | Facility: CLINIC | Age: 52
End: 2022-08-24

## 2022-08-24 VITALS
RESPIRATION RATE: 18 BRPM | SYSTOLIC BLOOD PRESSURE: 121 MMHG | DIASTOLIC BLOOD PRESSURE: 85 MMHG | SYSTOLIC BLOOD PRESSURE: 138 MMHG | DIASTOLIC BLOOD PRESSURE: 87 MMHG | HEIGHT: 69 IN | TEMPERATURE: 97.2 F | TEMPERATURE: 98.2 F | HEART RATE: 82 BPM | HEART RATE: 86 BPM | HEART RATE: 83 BPM | SYSTOLIC BLOOD PRESSURE: 137 MMHG

## 2022-08-24 DIAGNOSIS — K51.90 ULCERATIVE COLITIS, UNSPECIFIED, WITHOUT COMPLICATIONS: ICD-10-CM

## 2022-08-24 PROCEDURE — 96413 CHEMO IV INFUSION 1 HR: CPT | Performed by: INTERNAL MEDICINE

## 2022-08-24 NOTE — SERVICEHPINOTES
See follow up visit from  4/22/2022   .  brDate / Results of last TB test  9/24/2021   -   Negative   drawn today   brLabs and/or Additional orders: CBC &amp CMP due in Feb:drawn today brInsurance authorization:[employer] Approved PA for continuation of Entyvio-06/09/22-12/05/22 (SP)brPharmacy:  Specialty  brRate of Infusion:  Standard   
br   Infusion order placed on:  1/6/2022   
br

## 2022-10-03 ENCOUNTER — OFFICE (OUTPATIENT)
Dept: URBAN - METROPOLITAN AREA CLINIC 19 | Facility: CLINIC | Age: 52
End: 2022-10-03
Payer: COMMERCIAL

## 2022-10-03 VITALS — HEIGHT: 69 IN

## 2022-10-03 DIAGNOSIS — K76.0 FATTY (CHANGE OF) LIVER, NOT ELSEWHERE CLASSIFIED: ICD-10-CM

## 2022-10-03 PROCEDURE — 91200 LIVER ELASTOGRAPHY: CPT | Performed by: INTERNAL MEDICINE

## 2022-10-17 ENCOUNTER — OFFICE (OUTPATIENT)
Dept: URBAN - METROPOLITAN AREA CLINIC 11 | Facility: CLINIC | Age: 52
End: 2022-10-17

## 2022-10-17 VITALS
DIASTOLIC BLOOD PRESSURE: 95 MMHG | DIASTOLIC BLOOD PRESSURE: 102 MMHG | DIASTOLIC BLOOD PRESSURE: 100 MMHG | RESPIRATION RATE: 18 BRPM | HEART RATE: 73 BPM | SYSTOLIC BLOOD PRESSURE: 153 MMHG | HEART RATE: 79 BPM | SYSTOLIC BLOOD PRESSURE: 159 MMHG | TEMPERATURE: 97.8 F | TEMPERATURE: 97.3 F | SYSTOLIC BLOOD PRESSURE: 158 MMHG | HEIGHT: 69 IN

## 2022-10-17 DIAGNOSIS — K51.90 ULCERATIVE COLITIS, UNSPECIFIED, WITHOUT COMPLICATIONS: ICD-10-CM

## 2022-10-17 PROCEDURE — 96413 CHEMO IV INFUSION 1 HR: CPT | Performed by: INTERNAL MEDICINE

## 2022-10-17 NOTE — SERVICENOTES
Next Entyvio infusion is on 12/14/22  at 330 pm
Blood pressure was elevated pt states it's because of her autoimmune disorder.  She states that she's looking into switching PCP and will discuss her blood pressure with them.  She took her blood pressure medication this am, informed pt to check blood pressure often and follow up with PCP.

## 2022-10-17 NOTE — SERVICEHPINOTES
See follow up visit from  4/22/2022   .  brDate / Results of last TB test  8/24/2022   -   Negative  brLabs and/or Additional orders: CBC &amp CMP due in Feb brInsurance authorization: [employer] Approved PA for continuation of Entyvio-06/09/22-12/05/22 (SP)brPharmacy:  Specialty  brRate of Infusion:  Standard   
br   Infusion order placed on:  1/6/2022   
br 
Patient

## 2022-12-16 ENCOUNTER — OFFICE (OUTPATIENT)
Dept: URBAN - METROPOLITAN AREA CLINIC 11 | Facility: CLINIC | Age: 52
End: 2022-12-16
Payer: COMMERCIAL

## 2022-12-16 ENCOUNTER — OFFICE (OUTPATIENT)
Dept: URBAN - METROPOLITAN AREA CLINIC 11 | Facility: CLINIC | Age: 52
End: 2022-12-16

## 2022-12-16 VITALS
TEMPERATURE: 98.2 F | DIASTOLIC BLOOD PRESSURE: 96 MMHG | HEART RATE: 71 BPM | DIASTOLIC BLOOD PRESSURE: 91 MMHG | SYSTOLIC BLOOD PRESSURE: 151 MMHG | HEART RATE: 68 BPM | SYSTOLIC BLOOD PRESSURE: 139 MMHG | HEART RATE: 69 BPM | TEMPERATURE: 97.1 F | RESPIRATION RATE: 18 BRPM | HEIGHT: 69 IN | SYSTOLIC BLOOD PRESSURE: 132 MMHG | DIASTOLIC BLOOD PRESSURE: 93 MMHG

## 2022-12-16 DIAGNOSIS — K51.90 ULCERATIVE COLITIS, UNSPECIFIED, WITHOUT COMPLICATIONS: ICD-10-CM

## 2022-12-16 PROCEDURE — 96413 CHEMO IV INFUSION 1 HR: CPT | Performed by: INTERNAL MEDICINE

## 2022-12-16 NOTE — SERVICEHPINOTES
See follow up visit from  4/22/2022   .  brDate / Results of last TB test  8/24/2022   -   Negative  brLabs and/or Additional orders: CBC &amp CMP due in February.brInsurance authorization: [facility] Approved PA for continuation of Entyvio-10/22-22-04/19/23 ([facility]). Pt doesn't expect any insurance changes after the first of the year.brPharmacy:  [facility]  brRate of Infusion:  Standard   
br   Infusion order placed on:  12/16/2022   
br

## 2023-02-16 ENCOUNTER — OFFICE (OUTPATIENT)
Dept: URBAN - METROPOLITAN AREA CLINIC 11 | Facility: CLINIC | Age: 53
End: 2023-02-16
Payer: COMMERCIAL

## 2023-02-16 ENCOUNTER — OFFICE (OUTPATIENT)
Dept: URBAN - METROPOLITAN AREA CLINIC 11 | Facility: CLINIC | Age: 53
End: 2023-02-16

## 2023-02-16 VITALS
TEMPERATURE: 97.8 F | HEIGHT: 69 IN | RESPIRATION RATE: 18 BRPM | HEART RATE: 63 BPM | DIASTOLIC BLOOD PRESSURE: 88 MMHG | SYSTOLIC BLOOD PRESSURE: 130 MMHG | HEART RATE: 65 BPM | TEMPERATURE: 98.2 F | SYSTOLIC BLOOD PRESSURE: 132 MMHG | DIASTOLIC BLOOD PRESSURE: 85 MMHG

## 2023-02-16 DIAGNOSIS — K51.90 ULCERATIVE COLITIS, UNSPECIFIED, WITHOUT COMPLICATIONS: ICD-10-CM

## 2023-02-16 PROCEDURE — 96413 CHEMO IV INFUSION 1 HR: CPT | Performed by: INTERNAL MEDICINE

## 2023-02-16 NOTE — SERVICEHPINOTES
See follow up visit from 4/22/2022 .brDate / Results of last TB test 8/24/2022 - NegativebrLabs and/or Additional orders: CBC &amp CMP due in August. Drawn today.brInsurance authorization: [employer] Approved PA for continuation of Entyvio-10/22-22-04/19/23 [facility]brPharmacy: [facility]brRate of Infusion: StandardbrInfusion order placed on: 12/16/2022

## 2023-04-11 ENCOUNTER — OFFICE (OUTPATIENT)
Dept: URBAN - METROPOLITAN AREA CLINIC 11 | Facility: CLINIC | Age: 53
End: 2023-04-11

## 2023-04-11 VITALS
DIASTOLIC BLOOD PRESSURE: 85 MMHG | SYSTOLIC BLOOD PRESSURE: 132 MMHG | DIASTOLIC BLOOD PRESSURE: 92 MMHG | DIASTOLIC BLOOD PRESSURE: 86 MMHG | SYSTOLIC BLOOD PRESSURE: 123 MMHG | SYSTOLIC BLOOD PRESSURE: 124 MMHG | TEMPERATURE: 98 F | HEART RATE: 82 BPM | HEART RATE: 86 BPM | HEART RATE: 84 BPM | RESPIRATION RATE: 18 BRPM | HEIGHT: 69 IN | TEMPERATURE: 98.3 F

## 2023-04-11 DIAGNOSIS — K51.90 ULCERATIVE COLITIS, UNSPECIFIED, WITHOUT COMPLICATIONS: ICD-10-CM

## 2023-04-11 PROCEDURE — 96413 CHEMO IV INFUSION 1 HR: CPT | Performed by: INTERNAL MEDICINE

## 2023-04-11 NOTE — SERVICEHPINOTES
See follow up visit from  4/22/2022   .  brDate / Results of last TB test  8/24/2022   -   Negative  brLabs and/or Additional orders: CBC and CMP due in AugustbrInsurance authorization:BC/[employer] approved continuation of Entyvio 4/20/23-10/16/23([facility])brPharmacy:  [facility]  brRate of Infusion:  Standard   
br   Infusion order placed on:  12/16/2022   
br

## 2023-04-11 NOTE — SERVICENOTES
Next Entyvio infusion is on 6/12/23 at 3:30 [Time Spent: ___ minutes] : I have spent [unfilled] minutes of time on the encounter.

## 2023-04-21 ENCOUNTER — OFFICE (OUTPATIENT)
Dept: URBAN - METROPOLITAN AREA CLINIC 11 | Facility: CLINIC | Age: 53
End: 2023-04-21
Payer: COMMERCIAL

## 2023-04-21 DIAGNOSIS — K51.90 ULCERATIVE COLITIS, UNSPECIFIED, WITHOUT COMPLICATIONS: ICD-10-CM

## 2023-06-12 ENCOUNTER — OFFICE (OUTPATIENT)
Dept: URBAN - METROPOLITAN AREA CLINIC 11 | Facility: CLINIC | Age: 53
End: 2023-06-12
Payer: COMMERCIAL

## 2023-06-12 ENCOUNTER — OFFICE (OUTPATIENT)
Dept: URBAN - METROPOLITAN AREA CLINIC 11 | Facility: CLINIC | Age: 53
End: 2023-06-12

## 2023-06-12 VITALS
HEART RATE: 84 BPM | DIASTOLIC BLOOD PRESSURE: 98 MMHG | HEIGHT: 69 IN | SYSTOLIC BLOOD PRESSURE: 128 MMHG | TEMPERATURE: 98.2 F | SYSTOLIC BLOOD PRESSURE: 125 MMHG | HEART RATE: 90 BPM | TEMPERATURE: 98 F | SYSTOLIC BLOOD PRESSURE: 141 MMHG | DIASTOLIC BLOOD PRESSURE: 92 MMHG | RESPIRATION RATE: 16 BRPM | DIASTOLIC BLOOD PRESSURE: 96 MMHG

## 2023-06-12 DIAGNOSIS — K51.90 ULCERATIVE COLITIS, UNSPECIFIED, WITHOUT COMPLICATIONS: ICD-10-CM

## 2023-06-12 PROCEDURE — 96413 CHEMO IV INFUSION 1 HR: CPT | Performed by: INTERNAL MEDICINE

## 2023-06-12 NOTE — SERVICEHPINOTES
See follow up visit from  4/22/2022   .  brDate / Results of last TB test  6/24/2022   -   Negative  brLabs and/or Additional orders: CBC &amp CMP due in AugustbrInsurance authorization: BC/[employer] approved continuation of Entyvio 4/20/23-10/16/23([facility])brPharmacy:  [facility]  brRate of Infusion:  Standard   
br   Infusion order placed on:  12/16/2022   
br

## 2023-08-03 ENCOUNTER — OFFICE (OUTPATIENT)
Dept: URBAN - METROPOLITAN AREA CLINIC 11 | Facility: CLINIC | Age: 53
End: 2023-08-03
Payer: COMMERCIAL

## 2023-08-03 ENCOUNTER — OFFICE (OUTPATIENT)
Dept: URBAN - METROPOLITAN AREA CLINIC 11 | Facility: CLINIC | Age: 53
End: 2023-08-03

## 2023-08-03 VITALS
DIASTOLIC BLOOD PRESSURE: 87 MMHG | DIASTOLIC BLOOD PRESSURE: 86 MMHG | HEART RATE: 78 BPM | SYSTOLIC BLOOD PRESSURE: 137 MMHG | HEART RATE: 75 BPM | SYSTOLIC BLOOD PRESSURE: 127 MMHG | DIASTOLIC BLOOD PRESSURE: 80 MMHG | HEIGHT: 69 IN | TEMPERATURE: 98.2 F | SYSTOLIC BLOOD PRESSURE: 117 MMHG | HEART RATE: 76 BPM | RESPIRATION RATE: 18 BRPM | TEMPERATURE: 98.1 F

## 2023-08-03 DIAGNOSIS — K51.90 ULCERATIVE COLITIS, UNSPECIFIED, WITHOUT COMPLICATIONS: ICD-10-CM

## 2023-08-03 PROCEDURE — 96413 CHEMO IV INFUSION 1 HR: CPT | Performed by: INTERNAL MEDICINE

## 2023-08-03 NOTE — SERVICEHPINOTES
See follow up visit from  4/22/2022   .  brDate / Results of last TB test  8/24/2022   -   Negative   Drawn today   brLabs and/or Additional orders: CBC &amp CMP due in FebDrawn today brInsurance authorization: BC/[employer] approved continuation of Entyvio 4/20/23-10/16/23([facility])brPharmacy:  [facility]  brRate of Infusion:  Standard   
br   Infusion order placed on:  12/16/2022   
br  Clindamycin Pregnancy And Lactation Text: This medication can be used in pregnancy if certain situations. Clindamycin is also present in breast milk.

## 2023-08-03 NOTE — SERVICENOTES
Next Entyvio infusion is on 9/27/23 at 330 pm

Pt circled more symptoms on Infusion Questionnaire, states symptoms are from new diabetic pill not her UC.

## 2023-09-27 ENCOUNTER — OFFICE (OUTPATIENT)
Dept: URBAN - METROPOLITAN AREA CLINIC 11 | Facility: CLINIC | Age: 53
End: 2023-09-27
Payer: COMMERCIAL

## 2023-09-27 ENCOUNTER — OFFICE (OUTPATIENT)
Dept: URBAN - METROPOLITAN AREA CLINIC 11 | Facility: CLINIC | Age: 53
End: 2023-09-27

## 2023-09-27 VITALS
HEART RATE: 85 BPM | TEMPERATURE: 98.2 F | SYSTOLIC BLOOD PRESSURE: 107 MMHG | SYSTOLIC BLOOD PRESSURE: 112 MMHG | DIASTOLIC BLOOD PRESSURE: 77 MMHG | HEART RATE: 89 BPM | TEMPERATURE: 97.9 F | HEIGHT: 69 IN | DIASTOLIC BLOOD PRESSURE: 72 MMHG | HEART RATE: 82 BPM | DIASTOLIC BLOOD PRESSURE: 76 MMHG | RESPIRATION RATE: 18 BRPM | SYSTOLIC BLOOD PRESSURE: 118 MMHG

## 2023-09-27 VITALS
DIASTOLIC BLOOD PRESSURE: 90 MMHG | SYSTOLIC BLOOD PRESSURE: 146 MMHG | OXYGEN SATURATION: 98 % | WEIGHT: 194 LBS | HEIGHT: 69 IN | HEART RATE: 84 BPM

## 2023-09-27 DIAGNOSIS — K51.90 ULCERATIVE COLITIS, UNSPECIFIED, WITHOUT COMPLICATIONS: ICD-10-CM

## 2023-09-27 DIAGNOSIS — R11.0 NAUSEA: ICD-10-CM

## 2023-09-27 PROCEDURE — 96413 CHEMO IV INFUSION 1 HR: CPT | Performed by: INTERNAL MEDICINE

## 2023-09-27 PROCEDURE — 99213 OFFICE O/P EST LOW 20 MIN: CPT | Performed by: INTERNAL MEDICINE

## 2023-09-27 RX ORDER — ONDANSETRON 8 MG/1
TABLET, ORALLY DISINTEGRATING ORAL
Qty: 30 | Refills: 3 | Status: ACTIVE
Start: 2016-05-26

## 2023-09-27 NOTE — SERVICEHPINOTES
See follow up visit from  4/22/2022   .Scheduled on 12/8/23 for f/u task sent to move up f/u due to insurance   brDate / Results of last TB test  8/3/2023   -   Negative  brLabs and/or Additional orders: CBC &amp CMP due in Feb brInsurance authorization: BC/[employer] T[employer] approved continuation of Entyvio 4/20/23-10/16/23([facility]) task sent to precert group brPharmacy:  [facility]  brRate of Infusion:  Standard   
br   Infusion order placed on:  12/6/2022   
br

## 2023-11-22 ENCOUNTER — OFFICE (OUTPATIENT)
Dept: URBAN - METROPOLITAN AREA CLINIC 11 | Facility: CLINIC | Age: 53
End: 2023-11-22

## 2023-11-22 VITALS
TEMPERATURE: 97.7 F | RESPIRATION RATE: 18 BRPM | HEART RATE: 77 BPM | HEART RATE: 69 BPM | SYSTOLIC BLOOD PRESSURE: 121 MMHG | TEMPERATURE: 98.2 F | SYSTOLIC BLOOD PRESSURE: 142 MMHG | HEIGHT: 69 IN | DIASTOLIC BLOOD PRESSURE: 73 MMHG | DIASTOLIC BLOOD PRESSURE: 86 MMHG | SYSTOLIC BLOOD PRESSURE: 118 MMHG | HEART RATE: 66 BPM

## 2023-11-22 DIAGNOSIS — K51.90 ULCERATIVE COLITIS, UNSPECIFIED, WITHOUT COMPLICATIONS: ICD-10-CM

## 2023-11-22 PROCEDURE — 96413 CHEMO IV INFUSION 1 HR: CPT | Performed by: INTERNAL MEDICINE

## 2023-11-22 NOTE — SERVICEHPINOTES
See follow up visit from  9/27/2023   .  brDate / Results of last TB test  8/3/2023   -   Negative  brLabs and/or Additional orders: CBC and CMP due in FebruarybrInsurance authorization:BC/[employer] T[employer] approved continuation of Entyvio 10/26/23-4/22/24([facility])brPharmacy:  [facility]  brRate of Infusion:  Standard   
br   Infusion order placed on:  12/16/2022   
br

## 2024-01-23 ENCOUNTER — OFFICE (OUTPATIENT)
Dept: URBAN - METROPOLITAN AREA CLINIC 11 | Facility: CLINIC | Age: 54
End: 2024-01-23

## 2024-01-23 VITALS
SYSTOLIC BLOOD PRESSURE: 109 MMHG | RESPIRATION RATE: 18 BRPM | HEART RATE: 73 BPM | HEART RATE: 80 BPM | DIASTOLIC BLOOD PRESSURE: 87 MMHG | DIASTOLIC BLOOD PRESSURE: 81 MMHG | HEIGHT: 69 IN | DIASTOLIC BLOOD PRESSURE: 82 MMHG | SYSTOLIC BLOOD PRESSURE: 120 MMHG | TEMPERATURE: 97.7 F | SYSTOLIC BLOOD PRESSURE: 123 MMHG

## 2024-01-23 DIAGNOSIS — K51.90 ULCERATIVE COLITIS, UNSPECIFIED, WITHOUT COMPLICATIONS: ICD-10-CM

## 2024-01-23 PROCEDURE — 96413 CHEMO IV INFUSION 1 HR: CPT | Performed by: INTERNAL MEDICINE

## 2024-01-23 NOTE — SERVICEHPINOTES
See follow up visit from  9/27/2023   .  brDate / Results of last TB test  8/3/2023   -   Negative  brLabs and/or Additional orders: CBC and CMP due in FebruarybrInsurance authorization:BC/[employer] Tn approved continuation of Entyvio 10/26/23-4/22/24([facility])brPharmacy:  [facility]  brRate of Infusion:  Standard   
br   Infusion order placed on:  12/16/2022   
br

## 2024-03-26 ENCOUNTER — OFFICE (OUTPATIENT)
Dept: URBAN - METROPOLITAN AREA CLINIC 11 | Facility: CLINIC | Age: 54
End: 2024-03-26
Payer: COMMERCIAL

## 2024-03-26 VITALS
RESPIRATION RATE: 18 BRPM | SYSTOLIC BLOOD PRESSURE: 115 MMHG | SYSTOLIC BLOOD PRESSURE: 127 MMHG | TEMPERATURE: 97.2 F | HEART RATE: 73 BPM | TEMPERATURE: 97.4 F | HEART RATE: 76 BPM | DIASTOLIC BLOOD PRESSURE: 90 MMHG | DIASTOLIC BLOOD PRESSURE: 82 MMHG | HEIGHT: 69 IN | HEART RATE: 75 BPM | DIASTOLIC BLOOD PRESSURE: 89 MMHG | SYSTOLIC BLOOD PRESSURE: 118 MMHG

## 2024-03-26 DIAGNOSIS — K51.90 ULCERATIVE COLITIS, UNSPECIFIED, WITHOUT COMPLICATIONS: ICD-10-CM

## 2024-03-26 PROCEDURE — 96413 CHEMO IV INFUSION 1 HR: CPT | Performed by: INTERNAL MEDICINE

## 2024-03-26 NOTE — SERVICEHPINOTES
See follow up visit from 9/27/2023 .brDate / Results of last TB test 8/3/2023 -  NegativebrLabs and/or Additional orders: CBC and CMP due 09/24. Drawn todaybrInsurance authorization:BC/[place] Tn approved continuation of Entyvio 10/26/23-4/22/24(TransactRx) (Task sent)brPharmacy: Buy and BillbrRate of Infusion: StandardbrInfusion order placed on: 1/23/24

## 2024-05-14 ENCOUNTER — OFFICE (OUTPATIENT)
Dept: URBAN - METROPOLITAN AREA CLINIC 11 | Facility: CLINIC | Age: 54
End: 2024-05-14

## 2024-05-14 VITALS
DIASTOLIC BLOOD PRESSURE: 90 MMHG | HEIGHT: 69 IN | SYSTOLIC BLOOD PRESSURE: 140 MMHG | HEART RATE: 72 BPM | SYSTOLIC BLOOD PRESSURE: 135 MMHG | DIASTOLIC BLOOD PRESSURE: 93 MMHG | RESPIRATION RATE: 18 BRPM | SYSTOLIC BLOOD PRESSURE: 126 MMHG | HEART RATE: 71 BPM | HEART RATE: 76 BPM | DIASTOLIC BLOOD PRESSURE: 87 MMHG | TEMPERATURE: 98.2 F | TEMPERATURE: 97.9 F

## 2024-05-14 DIAGNOSIS — K51.90 ULCERATIVE COLITIS, UNSPECIFIED, WITHOUT COMPLICATIONS: ICD-10-CM

## 2024-05-14 PROCEDURE — 96413 CHEMO IV INFUSION 1 HR: CPT | Performed by: INTERNAL MEDICINE

## 2024-05-14 NOTE — SERVICEHPINOTES
See follow up visit from 9/27/2023 .brDate / Results of last TB test 8/3/2023 -  NegativebrLabs and/or Additional orders: CBC and CMP due 09/24brInsurance authorization: BC/[place] Tn approved continuation of Entyvio 4/30/24-4/29/25(TransactRx)brPharmacy: Buy and BillbrRate of Infusion: StandardbrInfusion order placed on: 1/23/24
br   Infusion Consent Date: 05/14/24

## 2024-07-05 ENCOUNTER — OFFICE (OUTPATIENT)
Dept: URBAN - METROPOLITAN AREA CLINIC 11 | Facility: CLINIC | Age: 54
End: 2024-07-05

## 2024-07-05 ENCOUNTER — OFFICE (OUTPATIENT)
Dept: URBAN - METROPOLITAN AREA CLINIC 11 | Facility: CLINIC | Age: 54
End: 2024-07-05
Payer: COMMERCIAL

## 2024-07-05 VITALS
SYSTOLIC BLOOD PRESSURE: 123 MMHG | RESPIRATION RATE: 18 BRPM | DIASTOLIC BLOOD PRESSURE: 88 MMHG | HEART RATE: 75 BPM | DIASTOLIC BLOOD PRESSURE: 82 MMHG | TEMPERATURE: 97.3 F | SYSTOLIC BLOOD PRESSURE: 127 MMHG | SYSTOLIC BLOOD PRESSURE: 120 MMHG | RESPIRATION RATE: 20 BRPM | HEIGHT: 69 IN | HEART RATE: 76 BPM | DIASTOLIC BLOOD PRESSURE: 81 MMHG | TEMPERATURE: 97.7 F | HEART RATE: 83 BPM

## 2024-07-05 DIAGNOSIS — K51.90 ULCERATIVE COLITIS, UNSPECIFIED, WITHOUT COMPLICATIONS: ICD-10-CM

## 2024-07-05 PROCEDURE — 96413 CHEMO IV INFUSION 1 HR: CPT | Performed by: INTERNAL MEDICINE

## 2024-07-05 NOTE — SERVICEHPINOTES
See follow up visit from 9/27/2023 .brDate / Results of last TB test 8/3/2023 - NegativebrLabs and/or Additional orders: CBC and CMP due 09/24brInsurance authorization: BC/[place] Tn approved continuation of Entyvio 4/30/24-4/29/25(TransactRx)brPharmacy: Buy and BillbrRate of Infusion: StandardbrInfusion order placed on: 1/23/24brInfusion Consent Date: 05/14/24

## 2024-10-29 ENCOUNTER — OFFICE (OUTPATIENT)
Dept: URBAN - METROPOLITAN AREA CLINIC 11 | Facility: CLINIC | Age: 54
End: 2024-10-29
Payer: COMMERCIAL

## 2024-10-29 VITALS
SYSTOLIC BLOOD PRESSURE: 125 MMHG | HEART RATE: 75 BPM | SYSTOLIC BLOOD PRESSURE: 123 MMHG | DIASTOLIC BLOOD PRESSURE: 82 MMHG | DIASTOLIC BLOOD PRESSURE: 85 MMHG | HEART RATE: 73 BPM | SYSTOLIC BLOOD PRESSURE: 137 MMHG | DIASTOLIC BLOOD PRESSURE: 91 MMHG | TEMPERATURE: 97.3 F | TEMPERATURE: 96.7 F | HEIGHT: 69 IN | RESPIRATION RATE: 18 BRPM

## 2024-10-29 DIAGNOSIS — K51.90 ULCERATIVE COLITIS, UNSPECIFIED, WITHOUT COMPLICATIONS: ICD-10-CM

## 2024-10-29 PROCEDURE — 96413 CHEMO IV INFUSION 1 HR: CPT | Performed by: INTERNAL MEDICINE
